# Patient Record
Sex: FEMALE | Race: ASIAN | NOT HISPANIC OR LATINO | URBAN - METROPOLITAN AREA
[De-identification: names, ages, dates, MRNs, and addresses within clinical notes are randomized per-mention and may not be internally consistent; named-entity substitution may affect disease eponyms.]

---

## 2018-02-12 ENCOUNTER — EMERGENCY (EMERGENCY)
Facility: HOSPITAL | Age: 28
LOS: 1 days | Discharge: HOME | End: 2018-02-12
Attending: EMERGENCY MEDICINE | Admitting: EMERGENCY MEDICINE
Payer: MEDICAID

## 2018-02-12 VITALS
RESPIRATION RATE: 16 BRPM | WEIGHT: 110.01 LBS | HEART RATE: 126 BPM | SYSTOLIC BLOOD PRESSURE: 129 MMHG | DIASTOLIC BLOOD PRESSURE: 80 MMHG | OXYGEN SATURATION: 100 % | TEMPERATURE: 99 F | HEIGHT: 64 IN

## 2018-02-12 VITALS
TEMPERATURE: 98 F | OXYGEN SATURATION: 100 % | DIASTOLIC BLOOD PRESSURE: 74 MMHG | HEART RATE: 94 BPM | RESPIRATION RATE: 16 BRPM | SYSTOLIC BLOOD PRESSURE: 105 MMHG

## 2018-02-12 DIAGNOSIS — O03.4 INCOMPLETE SPONTANEOUS ABORTION WITHOUT COMPLICATION: ICD-10-CM

## 2018-02-12 LAB
ALBUMIN SERPL ELPH-MCNC: 4.2 G/DL — SIGNIFICANT CHANGE UP (ref 3.3–5)
ALP SERPL-CCNC: 47 U/L — SIGNIFICANT CHANGE UP (ref 40–120)
ALT FLD-CCNC: 10 U/L — SIGNIFICANT CHANGE UP (ref 4–33)
APPEARANCE UR: CLEAR — SIGNIFICANT CHANGE UP
APTT BLD: 32.9 SEC — SIGNIFICANT CHANGE UP (ref 27.5–37.4)
AST SERPL-CCNC: 18 U/L — SIGNIFICANT CHANGE UP (ref 4–32)
BASOPHILS # BLD AUTO: 0.02 K/UL — SIGNIFICANT CHANGE UP (ref 0–0.2)
BASOPHILS NFR BLD AUTO: 0.2 % — SIGNIFICANT CHANGE UP (ref 0–2)
BILIRUB SERPL-MCNC: 0.3 MG/DL — SIGNIFICANT CHANGE UP (ref 0.2–1.2)
BILIRUB UR-MCNC: NEGATIVE — SIGNIFICANT CHANGE UP
BLD GP AB SCN SERPL QL: NEGATIVE — SIGNIFICANT CHANGE UP
BLOOD UR QL VISUAL: HIGH
BUN SERPL-MCNC: 9 MG/DL — SIGNIFICANT CHANGE UP (ref 7–23)
CALCIUM SERPL-MCNC: 9.4 MG/DL — SIGNIFICANT CHANGE UP (ref 8.4–10.5)
CHLORIDE SERPL-SCNC: 101 MMOL/L — SIGNIFICANT CHANGE UP (ref 98–107)
CO2 SERPL-SCNC: 24 MMOL/L — SIGNIFICANT CHANGE UP (ref 22–31)
COLOR SPEC: SIGNIFICANT CHANGE UP
CREAT SERPL-MCNC: 0.72 MG/DL — SIGNIFICANT CHANGE UP (ref 0.5–1.3)
EOSINOPHIL # BLD AUTO: 0 K/UL — SIGNIFICANT CHANGE UP (ref 0–0.5)
EOSINOPHIL NFR BLD AUTO: 0 % — SIGNIFICANT CHANGE UP (ref 0–6)
GLUCOSE SERPL-MCNC: 110 MG/DL — HIGH (ref 70–99)
GLUCOSE UR-MCNC: NEGATIVE — SIGNIFICANT CHANGE UP
HCG SERPL-ACNC: 27.49 MIU/ML — SIGNIFICANT CHANGE UP
HCT VFR BLD CALC: 34 % — LOW (ref 34.5–45)
HGB BLD-MCNC: 11.6 G/DL — SIGNIFICANT CHANGE UP (ref 11.5–15.5)
IMM GRANULOCYTES # BLD AUTO: 0.04 # — SIGNIFICANT CHANGE UP
IMM GRANULOCYTES NFR BLD AUTO: 0.4 % — SIGNIFICANT CHANGE UP (ref 0–1.5)
INR BLD: 1.32 — HIGH (ref 0.88–1.17)
KETONES UR-MCNC: HIGH
LEUKOCYTE ESTERASE UR-ACNC: NEGATIVE — SIGNIFICANT CHANGE UP
LYMPHOCYTES # BLD AUTO: 1.09 K/UL — SIGNIFICANT CHANGE UP (ref 1–3.3)
LYMPHOCYTES # BLD AUTO: 11.2 % — LOW (ref 13–44)
MCHC RBC-ENTMCNC: 32.1 PG — SIGNIFICANT CHANGE UP (ref 27–34)
MCHC RBC-ENTMCNC: 34.1 % — SIGNIFICANT CHANGE UP (ref 32–36)
MCV RBC AUTO: 94.2 FL — SIGNIFICANT CHANGE UP (ref 80–100)
MONOCYTES # BLD AUTO: 0.99 K/UL — HIGH (ref 0–0.9)
MONOCYTES NFR BLD AUTO: 10.2 % — SIGNIFICANT CHANGE UP (ref 2–14)
MUCOUS THREADS # UR AUTO: SIGNIFICANT CHANGE UP
NEUTROPHILS # BLD AUTO: 7.55 K/UL — HIGH (ref 1.8–7.4)
NEUTROPHILS NFR BLD AUTO: 78 % — HIGH (ref 43–77)
NITRITE UR-MCNC: NEGATIVE — SIGNIFICANT CHANGE UP
NRBC # FLD: 0 — SIGNIFICANT CHANGE UP
PH UR: 6.5 — SIGNIFICANT CHANGE UP (ref 4.6–8)
PLATELET # BLD AUTO: 184 K/UL — SIGNIFICANT CHANGE UP (ref 150–400)
PMV BLD: 10.3 FL — SIGNIFICANT CHANGE UP (ref 7–13)
POTASSIUM SERPL-MCNC: 3.9 MMOL/L — SIGNIFICANT CHANGE UP (ref 3.5–5.3)
POTASSIUM SERPL-SCNC: 3.9 MMOL/L — SIGNIFICANT CHANGE UP (ref 3.5–5.3)
PROT SERPL-MCNC: 7.5 G/DL — SIGNIFICANT CHANGE UP (ref 6–8.3)
PROT UR-MCNC: NEGATIVE MG/DL — SIGNIFICANT CHANGE UP
PROTHROM AB SERPL-ACNC: 15.3 SEC — HIGH (ref 9.8–13.1)
RBC # BLD: 3.61 M/UL — LOW (ref 3.8–5.2)
RBC # FLD: 11.7 % — SIGNIFICANT CHANGE UP (ref 10.3–14.5)
RBC CASTS # UR COMP ASSIST: SIGNIFICANT CHANGE UP (ref 0–?)
RH IG SCN BLD-IMP: POSITIVE — SIGNIFICANT CHANGE UP
SODIUM SERPL-SCNC: 138 MMOL/L — SIGNIFICANT CHANGE UP (ref 135–145)
SP GR SPEC: 1.02 — SIGNIFICANT CHANGE UP (ref 1–1.04)
SQUAMOUS # UR AUTO: SIGNIFICANT CHANGE UP
UROBILINOGEN FLD QL: NORMAL MG/DL — SIGNIFICANT CHANGE UP
WBC # BLD: 9.69 K/UL — SIGNIFICANT CHANGE UP (ref 3.8–10.5)
WBC # FLD AUTO: 9.69 K/UL — SIGNIFICANT CHANGE UP (ref 3.8–10.5)
WBC UR QL: SIGNIFICANT CHANGE UP (ref 0–?)

## 2018-02-12 PROCEDURE — 99285 EMERGENCY DEPT VISIT HI MDM: CPT

## 2018-02-12 PROCEDURE — 76830 TRANSVAGINAL US NON-OB: CPT | Mod: 26

## 2018-02-12 RX ORDER — SODIUM CHLORIDE 9 MG/ML
2000 INJECTION INTRAMUSCULAR; INTRAVENOUS; SUBCUTANEOUS ONCE
Qty: 0 | Refills: 0 | Status: COMPLETED | OUTPATIENT
Start: 2018-02-12 | End: 2018-02-12

## 2018-02-12 RX ADMIN — SODIUM CHLORIDE 2000 MILLILITER(S): 9 INJECTION INTRAMUSCULAR; INTRAVENOUS; SUBCUTANEOUS at 17:59

## 2018-02-12 NOTE — ED PROVIDER NOTE - ATTENDING CONTRIBUTION TO CARE
Naila WALLACE- 28 y/o F with  LMP 2 wks ago and had recent medical  p/w heavy vag bleeding today. Pt states that she had vag bleeding post  and then resolved and now started since yesterday again, no pelvic pain, nausea, vomiting but feels weak and tired    pt is alert, well appearing female, s1s2 normal reg, b/l clear breath sounds, abd soft, nt, nd, normal bowel sounds, pelvic exam showed pooling of blood but os closed, no cvat b/l, neuro exam aox3, cn 2-12 intact, skin warm, dry, good turgor    plan to check labs, ua, us , evaluate for imcomplete  Naila WALLACE- 26 y/o F with  LMP 2 wks ago and had recent medical  p/w heavy vag bleeding today. Pt states that she had vag bleeding post  and then resolved and now started since yesterday again, no pelvic pain, nausea, vomiting but feels weak and tired    pt is alert, well appearing female, s1s2 normal reg, b/l clear breath sounds, abd soft, nt, nd, normal bowel sounds, pelvic exam showed pooling of blood but os closed, no cvat b/l, neuro exam aox3, cn 2-12 intact, skin warm, dry, good turgor    plan to check labs, ua, us , evaluate for incomplete

## 2018-02-12 NOTE — CONSULT NOTE ADULT - ASSESSMENT
28yo  LMP  1 month s/p medical termination @ 4wks with vaginal bleeding found to have retained products of conception    Pt is afebrile, hemodynamically stable without abdominal pain or heavy bleeding on exam. No clinical signs of infection or bleeding

## 2018-02-12 NOTE — CONSULT NOTE ADULT - SUBJECTIVE AND OBJECTIVE BOX
HPI:    27y G_P_, LMP      presents with       OBHx:  GynHx:   PMH:  PSH:  All:  Meds:   SocialHx:     Vital Signs Last 24 Hrs  T(C): 36.8 (12 Feb 2018 20:31), Max: 37.1 (12 Feb 2018 15:45)  T(F): 98.2 (12 Feb 2018 20:31), Max: 98.7 (12 Feb 2018 15:45)  HR: 94 (12 Feb 2018 20:31) (94 - 126)  BP: 105/74 (12 Feb 2018 20:31) (105/74 - 134/89)  BP(mean): --  RR: 16 (12 Feb 2018 20:31) (16 - 16)  SpO2: 100% (12 Feb 2018 20:31) (100% - 100%)    PE:  Gen: Comfortable, NAD  CV: RRR  Pulm: CTAB  Abd: Soft, NT  Ext: No edema or tenderness bilaterally  Spec Exam:    LABS:                        11.6   9.69  )-----------( 184      ( 12 Feb 2018 18:00 )             34.0     02-12    138  |  101  |  9   ----------------------------<  110<H>  3.9   |  24  |  0.72    Ca    9.4      12 Feb 2018 18:00    TPro  7.5  /  Alb  4.2  /  TBili  0.3  /  DBili  x   /  AST  18  /  ALT  10  /  AlkPhos  47  02-12    PT/INR - ( 12 Feb 2018 18:00 )   PT: 15.3 SEC;   INR: 1.32          PTT - ( 12 Feb 2018 18:00 )  PTT:32.9 SEC      RADIOLOGY & ADDITIONAL STUDIES:  < from: US Transvaginal (02.12.18 @ 19:55) >    FINDINGS:    Uterus: 8.0 x 4.7 x 5.5 cm. Within normal limits.    Endometrium: Measures up to 12 mm. Heterogeneous endometrial thickening   in the fundus with associated trophoblastic flow with peak systolic   velocity of 47 cm/s, compatible with retained products of conception..    Right ovary: 3.1 x 2.0 x 1.8 cm, containing a 0.7 x 0.7 x 0.8 cm corpus   luteum.    Left ovary: 2.9 x 1.8 x 2.3 cm. Within normal limits.    Fluid: None.    IMPRESSION:  Endometrial thickening with hypervascularity in keeping with retained   products of conception at the uterine fundus. R2 GYN Consult Note    26yo  LMP  1 month s/p medical termination @ 4wks GA at [facility] (1/15/18) presenting to ED with vaginal bleeding x 2 days.       OBHx:  GynHx:   PMH:  PSH:  All:  Meds:   SocialHx:     Vital Signs Last 24 Hrs  T(C): 36.8 (2018 20:31), Max: 37.1 (2018 15:45)  T(F): 98.2 (2018 20:31), Max: 98.7 (2018 15:45)  HR: 94 (2018 20:31) (94 - 126)  BP: 105/74 (2018 20:31) (105/74 - 134/89)  BP(mean): --  RR: 16 (2018 20:31) (16 - 16)  SpO2: 100% (2018 20:31) (100% - 100%)    PE:  Gen: Comfortable, NAD  CV: RRR  Pulm: CTAB  Abd: Soft, NT  Ext: No edema or tenderness bilaterally  Spec Exam:    LABS:                        11.6   9.69  )-----------( 184      ( 2018 18:00 )             34.0     02-12    138  |  101  |  9   ----------------------------<  110<H>  3.9   |  24  |  0.72    Ca    9.4      2018 18:00    TPro  7.5  /  Alb  4.2  /  TBili  0.3  /  DBili  x   /  AST  18  /  ALT  10  /  AlkPhos  47  -12    PT/INR - ( 2018 18:00 )   PT: 15.3 SEC;   INR: 1.32          PTT - ( 2018 18:00 )  PTT:32.9 SEC      RADIOLOGY & ADDITIONAL STUDIES:  < from: US Transvaginal (18 @ 19:55) >    FINDINGS:    Uterus: 8.0 x 4.7 x 5.5 cm. Within normal limits.    Endometrium: Measures up to 12 mm. Heterogeneous endometrial thickening   in the fundus with associated trophoblastic flow with peak systolic   velocity of 47 cm/s, compatible with retained products of conception..    Right ovary: 3.1 x 2.0 x 1.8 cm, containing a 0.7 x 0.7 x 0.8 cm corpus   luteum.    Left ovary: 2.9 x 1.8 x 2.3 cm. Within normal limits.    Fluid: None.    IMPRESSION:  Endometrial thickening with hypervascularity in keeping with retained   products of conception at the uterine fundus. R2 GYN Consult Note    26yo  LMP  1 month s/p medical termination @ 4wks GA at [facility] (1/15/18) presenting to ED with vaginal bleeding x 2 days. Patient took cytotec as prescribed, experienced bleeding for 1wk, lighter for the next week then no bleeding at all for 2 weeks until the past 2 days. Patient was seen at [facility] 2 weeks after termination, and was told that her uterus was empty on ultrasound. Bleeding described as light period yesterday, then heavier this morning with large clots in bathroom. At that time, patient felt lightheaded so she decided to come in to ED. Patient now reports lighter bleeding since 2 pm today.   Denies fever, chills, chest pain, SOB, palpitations. She is ambulating without difficulty and tolerating regular diet without nausea or vomiting.  GYN = [place]    OBHx:  x1, eTOP x1 (med)  GynHx: denies h/o fibroids, cysts, STDs  PAST MEDICAL & SURGICAL HISTORY:  No pertinent past medical history  No significant past surgical history      Vital Signs Last 24 Hrs  T(C): 36.8 (2018 20:31), Max: 37.1 (2018 15:45)  T(F): 98.2 (2018 20:31), Max: 98.7 (2018 15:45)  HR: 94 (2018 20:31) (94 - 126)  BP: 105/74 (2018 20:31) (105/74 - 134/89)  RR: 16 (2018 20:31) (16 - 16)  SpO2: 100% (2018 20:31) (100% - 100%)    PE:  Gen: Comfortable, NAD  CV: RRR  Pulm: CTAB  Abd: Soft, NT, no rebound or guarding, no fundal tenderness  Ext: No edema or tenderness bilaterally  Spec Exam: small dark blood evacuated from os, no active bleeding on Valsalva  Bimanual: small anteverted uterus nontender, no CMT, no adnexal tenderness bilaterally    LABS:                        11.6   9.69  )-----------( 184      ( 2018 18:00 )             34.0     02-12    138  |  101  |  9   ----------------------------<  110<H>  3.9   |  24  |  0.72    Ca    9.4      2018 18:00    TPro  7.5  /  Alb  4.2  /  TBili  0.3  /  DBili  x   /  AST  18  /  ALT  10  /  AlkPhos  47      PT/INR - ( 2018 18:00 )   PT: 15.3 SEC;   INR: 1.32          PTT - ( 2018 18:00 )  PTT:32.9 SEC      RADIOLOGY & ADDITIONAL STUDIES:  < from: US Transvaginal (18 @ 19:55) >    FINDINGS:    Uterus: 8.0 x 4.7 x 5.5 cm. Within normal limits.    Endometrium: Measures up to 12 mm. Heterogeneous endometrial thickening   in the fundus with associated trophoblastic flow with peak systolic   velocity of 47 cm/s, compatible with retained products of conception..    Right ovary: 3.1 x 2.0 x 1.8 cm, containing a 0.7 x 0.7 x 0.8 cm corpus   luteum.    Left ovary: 2.9 x 1.8 x 2.3 cm. Within normal limits.    Fluid: None.    IMPRESSION:  Endometrial thickening with hypervascularity in keeping with retained   products of conception at the uterine fundus.

## 2018-02-12 NOTE — ED PROVIDER NOTE - OBJECTIVE STATEMENT
27F  p/w 2 days of vaginal bleeding requiring 6pads today. Pt took " pill" in mid january with 2 weeks of bleeding following. Pt had 2 weeks of no vaginal bleeding, and bleeding started again yesterday. Pt endorses dizziness, denies SOB, chest pain, dyspnea on exertion. Pt had ab at [facility]. No fevers, chills, cough, abd pain, pelvic pain, n/v/d

## 2018-02-12 NOTE — ED ADULT NURSE NOTE - OBJECTIVE STATEMENT
pt received spot 19. pt A+Ox3 states she was prescribed  pills by her gyn at a clinic in mid january. reports bleeding x 2 weeks afterwards. states heavy bleeding began yesterday again. +clots. +weakness. denies cp/sob. repeat vs as noted. IVSL in place. labs sent. NAD noted at this time. will monitor.

## 2018-02-12 NOTE — CONSULT NOTE ADULT - PROBLEM SELECTOR RECOMMENDATION 9
- misoprostol 600mcg buccally x1  - advised patient on expectations. Given precautions to return for fever, heavy bleeding > 8hrs, lightheadedness or other acute sx  - advised to not insert anything into vagina  - return to [facility] GYN clinic for followup. Pt given number. Will contact clinic as well    D/w Dr. Dorian Forde, PGY2  [id number]

## 2018-02-12 NOTE — ED PROVIDER NOTE - MEDICAL DECISION MAKING DETAILS
27F with recent medication-induced ab p/w heavy vaginal bleeding x2 days. Check CBC CMP HCG TVUS and reassess. Concern for retained products

## 2018-02-12 NOTE — ED ADULT TRIAGE NOTE - CHIEF COMPLAINT QUOTE
Pt took  pill 4 weeks ago, went to follow up appointment told "everything normal" pt states she has been bleeding heavily with clots for two weeks used 6 pads today so far, pt states no pain but starting to feel dizzy and weak

## 2018-02-13 NOTE — CHART NOTE - NSCHARTNOTEFT_GEN_A_CORE
Patient called GYN resident on call due to questions regarding medication. Patient was seen in ED overnight s/p medication  4 weeks ago, she was given cytotec in ED due to possible retained products. Patient is concerned because she is NOT bleeding heavily and thought she would. She is having some vaginal bleeding and used 1 pad since last night. Counseled patient that this amount of bleeding may be normal. Denies fevers, chills, CP, SOB, N/V. Denies any pain. Patient has f/u appt in [facility] GYN clinic on  at 4:30pm. She will return to hospital with heavy bleeding, pain, fever, etc.    Angeline Hitchcock MD PGY4 #[id number]

## 2018-02-13 NOTE — ED POST DISCHARGE NOTE - REASON FOR FOLLOW-UP
Other Admin PA Note: Patient called with a question when she should get her period. I contacted GYN resident Cristin who will call patient to discuss with patient any concerns or questions.

## 2018-02-21 ENCOUNTER — APPOINTMENT (OUTPATIENT)
Dept: OBGYN | Facility: HOSPITAL | Age: 28
End: 2018-02-21
Payer: MEDICAID

## 2018-02-21 ENCOUNTER — OUTPATIENT (OUTPATIENT)
Dept: OUTPATIENT SERVICES | Facility: HOSPITAL | Age: 28
LOS: 1 days | End: 2018-02-21

## 2018-02-21 VITALS — WEIGHT: 110 LBS | SYSTOLIC BLOOD PRESSURE: 101 MMHG | DIASTOLIC BLOOD PRESSURE: 62 MMHG | HEART RATE: 92 BPM

## 2018-02-21 DIAGNOSIS — Z00.00 ENCOUNTER FOR GENERAL ADULT MEDICAL EXAMINATION W/OUT ABNORMAL FINDINGS: ICD-10-CM

## 2018-02-21 PROCEDURE — 99213 OFFICE O/P EST LOW 20 MIN: CPT | Mod: GE

## 2018-02-23 DIAGNOSIS — Z00.00 ENCOUNTER FOR GENERAL ADULT MEDICAL EXAMINATION WITHOUT ABNORMAL FINDINGS: ICD-10-CM

## 2018-08-10 ENCOUNTER — EMERGENCY (EMERGENCY)
Facility: HOSPITAL | Age: 28
LOS: 1 days | Discharge: HOME | End: 2018-08-10
Admitting: EMERGENCY MEDICINE
Payer: MEDICAID

## 2018-08-10 VITALS
SYSTOLIC BLOOD PRESSURE: 114 MMHG | TEMPERATURE: 99 F | HEART RATE: 104 BPM | DIASTOLIC BLOOD PRESSURE: 74 MMHG | OXYGEN SATURATION: 100 % | RESPIRATION RATE: 16 BRPM

## 2018-08-10 LAB
ALBUMIN SERPL ELPH-MCNC: 4.1 G/DL — SIGNIFICANT CHANGE UP (ref 3.3–5)
ALP SERPL-CCNC: 41 U/L — SIGNIFICANT CHANGE UP (ref 40–120)
ALT FLD-CCNC: 9 U/L — SIGNIFICANT CHANGE UP (ref 4–33)
APPEARANCE UR: CLEAR — SIGNIFICANT CHANGE UP
AST SERPL-CCNC: 18 U/L — SIGNIFICANT CHANGE UP (ref 4–32)
BASOPHILS # BLD AUTO: 0.05 K/UL — SIGNIFICANT CHANGE UP (ref 0–0.2)
BASOPHILS NFR BLD AUTO: 0.3 % — SIGNIFICANT CHANGE UP (ref 0–2)
BILIRUB SERPL-MCNC: 0.2 MG/DL — SIGNIFICANT CHANGE UP (ref 0.2–1.2)
BILIRUB UR-MCNC: NEGATIVE — SIGNIFICANT CHANGE UP
BLD GP AB SCN SERPL QL: NEGATIVE — SIGNIFICANT CHANGE UP
BLOOD UR QL VISUAL: HIGH
BUN SERPL-MCNC: 8 MG/DL — SIGNIFICANT CHANGE UP (ref 7–23)
CALCIUM SERPL-MCNC: 9.2 MG/DL — SIGNIFICANT CHANGE UP (ref 8.4–10.5)
CHLORIDE SERPL-SCNC: 103 MMOL/L — SIGNIFICANT CHANGE UP (ref 98–107)
CO2 SERPL-SCNC: 22 MMOL/L — SIGNIFICANT CHANGE UP (ref 22–31)
COLOR SPEC: SIGNIFICANT CHANGE UP
CREAT SERPL-MCNC: 0.64 MG/DL — SIGNIFICANT CHANGE UP (ref 0.5–1.3)
EOSINOPHIL # BLD AUTO: 0.03 K/UL — SIGNIFICANT CHANGE UP (ref 0–0.5)
EOSINOPHIL NFR BLD AUTO: 0.2 % — SIGNIFICANT CHANGE UP (ref 0–6)
GLUCOSE SERPL-MCNC: 82 MG/DL — SIGNIFICANT CHANGE UP (ref 70–99)
GLUCOSE UR-MCNC: NEGATIVE — SIGNIFICANT CHANGE UP
HCG SERPL-ACNC: SIGNIFICANT CHANGE UP MIU/ML
HCT VFR BLD CALC: 35.7 % — SIGNIFICANT CHANGE UP (ref 34.5–45)
HGB BLD-MCNC: 12 G/DL — SIGNIFICANT CHANGE UP (ref 11.5–15.5)
IMM GRANULOCYTES # BLD AUTO: 0.09 # — SIGNIFICANT CHANGE UP
IMM GRANULOCYTES NFR BLD AUTO: 0.6 % — SIGNIFICANT CHANGE UP (ref 0–1.5)
KETONES UR-MCNC: NEGATIVE — SIGNIFICANT CHANGE UP
LEUKOCYTE ESTERASE UR-ACNC: NEGATIVE — SIGNIFICANT CHANGE UP
LYMPHOCYTES # BLD AUTO: 1.62 K/UL — SIGNIFICANT CHANGE UP (ref 1–3.3)
LYMPHOCYTES # BLD AUTO: 11.3 % — LOW (ref 13–44)
MCHC RBC-ENTMCNC: 30.5 PG — SIGNIFICANT CHANGE UP (ref 27–34)
MCHC RBC-ENTMCNC: 33.6 % — SIGNIFICANT CHANGE UP (ref 32–36)
MCV RBC AUTO: 90.8 FL — SIGNIFICANT CHANGE UP (ref 80–100)
MONOCYTES # BLD AUTO: 0.92 K/UL — HIGH (ref 0–0.9)
MONOCYTES NFR BLD AUTO: 6.4 % — SIGNIFICANT CHANGE UP (ref 2–14)
MUCOUS THREADS # UR AUTO: SIGNIFICANT CHANGE UP
NEUTROPHILS # BLD AUTO: 11.64 K/UL — HIGH (ref 1.8–7.4)
NEUTROPHILS NFR BLD AUTO: 81.2 % — HIGH (ref 43–77)
NITRITE UR-MCNC: NEGATIVE — SIGNIFICANT CHANGE UP
NRBC # FLD: 0 — SIGNIFICANT CHANGE UP
PH UR: 6 — SIGNIFICANT CHANGE UP (ref 4.6–8)
PLATELET # BLD AUTO: 200 K/UL — SIGNIFICANT CHANGE UP (ref 150–400)
PMV BLD: 10.6 FL — SIGNIFICANT CHANGE UP (ref 7–13)
POTASSIUM SERPL-MCNC: 4 MMOL/L — SIGNIFICANT CHANGE UP (ref 3.5–5.3)
POTASSIUM SERPL-SCNC: 4 MMOL/L — SIGNIFICANT CHANGE UP (ref 3.5–5.3)
PROT SERPL-MCNC: 7.1 G/DL — SIGNIFICANT CHANGE UP (ref 6–8.3)
PROT UR-MCNC: NEGATIVE MG/DL — SIGNIFICANT CHANGE UP
RBC # BLD: 3.93 M/UL — SIGNIFICANT CHANGE UP (ref 3.8–5.2)
RBC # FLD: 12.2 % — SIGNIFICANT CHANGE UP (ref 10.3–14.5)
RBC CASTS # UR COMP ASSIST: SIGNIFICANT CHANGE UP (ref 0–?)
RH IG SCN BLD-IMP: POSITIVE — SIGNIFICANT CHANGE UP
SODIUM SERPL-SCNC: 139 MMOL/L — SIGNIFICANT CHANGE UP (ref 135–145)
SP GR SPEC: 1.01 — SIGNIFICANT CHANGE UP (ref 1–1.04)
SQUAMOUS # UR AUTO: SIGNIFICANT CHANGE UP
UROBILINOGEN FLD QL: NORMAL MG/DL — SIGNIFICANT CHANGE UP
WBC # BLD: 14.35 K/UL — HIGH (ref 3.8–10.5)
WBC # FLD AUTO: 14.35 K/UL — HIGH (ref 3.8–10.5)
WBC UR QL: SIGNIFICANT CHANGE UP (ref 0–?)

## 2018-08-10 PROCEDURE — 76830 TRANSVAGINAL US NON-OB: CPT | Mod: 26

## 2018-08-10 PROCEDURE — 99285 EMERGENCY DEPT VISIT HI MDM: CPT

## 2018-08-10 NOTE — ED PROVIDER NOTE - OBJECTIVE STATEMENT
27 y/o female , 9 weeks pregnant, LMP  c/o lower abd cramping x5 days and vaginal bleeding x today. Pt admits to intermittent cramping pain similar to period pain. Pt admits to light vaginal bleeding today, similar to the 1st day of her period. Pt admits to having + IUP several weeks ago. Pt denies chest pain, sob, n/v/d, dysuria, passing clots, numbness, tingling, weakness, dizziness, syncope, fever or chills.

## 2018-08-10 NOTE — ED ADULT NURSE NOTE - NSIMPLEMENTINTERV_GEN_ALL_ED
Implemented All Universal Safety Interventions:  [place] to call system. Call bell, personal items and telephone within reach. Instruct patient to call for assistance. Room bathroom lighting operational. Non-slip footwear when patient is off stretcher. Physically safe environment: no spills, clutter or unnecessary equipment. Stretcher in lowest position, wheels locked, appropriate side rails in place.

## 2018-08-10 NOTE — ED ADULT NURSE NOTE - OBJECTIVE STATEMENT
27 y/o female presents with c/o abd cramping.  Pt states that she is 9 weeks pregnant LMP , .  Pt states that she has been having lower abd cramping for the last 5 days similar to menstrual cramps and started having some light vaginal bleeding today.  Pt denies n/v/d, no fever chills, denies dizziness or lightheadedness.  IV established, labs drawn and sent.  family at bedside, will cont to monitor.

## 2018-08-11 ENCOUNTER — EMERGENCY (EMERGENCY)
Facility: HOSPITAL | Age: 28
LOS: 1 days | Discharge: HOME | End: 2018-08-11
Attending: EMERGENCY MEDICINE | Admitting: EMERGENCY MEDICINE
Payer: MEDICAID

## 2018-08-11 VITALS
OXYGEN SATURATION: 100 % | TEMPERATURE: 98 F | HEART RATE: 80 BPM | SYSTOLIC BLOOD PRESSURE: 115 MMHG | RESPIRATION RATE: 18 BRPM | DIASTOLIC BLOOD PRESSURE: 69 MMHG

## 2018-08-11 VITALS
OXYGEN SATURATION: 100 % | SYSTOLIC BLOOD PRESSURE: 123 MMHG | HEART RATE: 100 BPM | TEMPERATURE: 98 F | DIASTOLIC BLOOD PRESSURE: 92 MMHG | RESPIRATION RATE: 18 BRPM

## 2018-08-11 DIAGNOSIS — O20.0 THREATENED ABORTION: ICD-10-CM

## 2018-08-11 LAB
ALBUMIN SERPL ELPH-MCNC: 4.1 G/DL — SIGNIFICANT CHANGE UP (ref 3.3–5)
ALP SERPL-CCNC: 42 U/L — SIGNIFICANT CHANGE UP (ref 40–120)
ALT FLD-CCNC: 11 U/L — SIGNIFICANT CHANGE UP (ref 4–33)
APTT BLD: 29.5 SEC — SIGNIFICANT CHANGE UP (ref 27.5–37.4)
AST SERPL-CCNC: 18 U/L — SIGNIFICANT CHANGE UP (ref 4–32)
BASOPHILS # BLD AUTO: 0.05 K/UL — SIGNIFICANT CHANGE UP (ref 0–0.2)
BASOPHILS NFR BLD AUTO: 0.3 % — SIGNIFICANT CHANGE UP (ref 0–2)
BILIRUB SERPL-MCNC: 0.3 MG/DL — SIGNIFICANT CHANGE UP (ref 0.2–1.2)
BLD GP AB SCN SERPL QL: NEGATIVE — SIGNIFICANT CHANGE UP
BUN SERPL-MCNC: 7 MG/DL — SIGNIFICANT CHANGE UP (ref 7–23)
CALCIUM SERPL-MCNC: 9.1 MG/DL — SIGNIFICANT CHANGE UP (ref 8.4–10.5)
CHLORIDE SERPL-SCNC: 102 MMOL/L — SIGNIFICANT CHANGE UP (ref 98–107)
CO2 SERPL-SCNC: 22 MMOL/L — SIGNIFICANT CHANGE UP (ref 22–31)
CREAT SERPL-MCNC: 0.64 MG/DL — SIGNIFICANT CHANGE UP (ref 0.5–1.3)
EOSINOPHIL # BLD AUTO: 0.06 K/UL — SIGNIFICANT CHANGE UP (ref 0–0.5)
EOSINOPHIL NFR BLD AUTO: 0.4 % — SIGNIFICANT CHANGE UP (ref 0–6)
GLUCOSE SERPL-MCNC: 99 MG/DL — SIGNIFICANT CHANGE UP (ref 70–99)
HCT VFR BLD CALC: 37 % — SIGNIFICANT CHANGE UP (ref 34.5–45)
HGB BLD-MCNC: 12.5 G/DL — SIGNIFICANT CHANGE UP (ref 11.5–15.5)
IMM GRANULOCYTES # BLD AUTO: 0.08 # — SIGNIFICANT CHANGE UP
IMM GRANULOCYTES NFR BLD AUTO: 0.5 % — SIGNIFICANT CHANGE UP (ref 0–1.5)
INR BLD: 1.26 — HIGH (ref 0.88–1.17)
LYMPHOCYTES # BLD AUTO: 1.37 K/UL — SIGNIFICANT CHANGE UP (ref 1–3.3)
LYMPHOCYTES # BLD AUTO: 8.1 % — LOW (ref 13–44)
MCHC RBC-ENTMCNC: 31.5 PG — SIGNIFICANT CHANGE UP (ref 27–34)
MCHC RBC-ENTMCNC: 33.8 % — SIGNIFICANT CHANGE UP (ref 32–36)
MCV RBC AUTO: 93.2 FL — SIGNIFICANT CHANGE UP (ref 80–100)
MONOCYTES # BLD AUTO: 0.93 K/UL — HIGH (ref 0–0.9)
MONOCYTES NFR BLD AUTO: 5.5 % — SIGNIFICANT CHANGE UP (ref 2–14)
NEUTROPHILS # BLD AUTO: 14.5 K/UL — HIGH (ref 1.8–7.4)
NEUTROPHILS NFR BLD AUTO: 85.2 % — HIGH (ref 43–77)
NRBC # FLD: 0 — SIGNIFICANT CHANGE UP
PLATELET # BLD AUTO: 213 K/UL — SIGNIFICANT CHANGE UP (ref 150–400)
PMV BLD: 10.8 FL — SIGNIFICANT CHANGE UP (ref 7–13)
POTASSIUM SERPL-MCNC: 4 MMOL/L — SIGNIFICANT CHANGE UP (ref 3.5–5.3)
POTASSIUM SERPL-SCNC: 4 MMOL/L — SIGNIFICANT CHANGE UP (ref 3.5–5.3)
PROT SERPL-MCNC: 7.2 G/DL — SIGNIFICANT CHANGE UP (ref 6–8.3)
PROTHROM AB SERPL-ACNC: 14 SEC — HIGH (ref 9.8–13.1)
RBC # BLD: 3.97 M/UL — SIGNIFICANT CHANGE UP (ref 3.8–5.2)
RBC # FLD: 12.3 % — SIGNIFICANT CHANGE UP (ref 10.3–14.5)
RH IG SCN BLD-IMP: POSITIVE — SIGNIFICANT CHANGE UP
SODIUM SERPL-SCNC: 137 MMOL/L — SIGNIFICANT CHANGE UP (ref 135–145)
WBC # BLD: 16.99 K/UL — HIGH (ref 3.8–10.5)
WBC # FLD AUTO: 16.99 K/UL — HIGH (ref 3.8–10.5)

## 2018-08-11 PROCEDURE — 76830 TRANSVAGINAL US NON-OB: CPT | Mod: 26

## 2018-08-11 PROCEDURE — 99285 EMERGENCY DEPT VISIT HI MDM: CPT | Mod: 25

## 2018-08-11 RX ORDER — ACETAMINOPHEN 500 MG
975 TABLET ORAL ONCE
Qty: 0 | Refills: 0 | Status: COMPLETED | OUTPATIENT
Start: 2018-08-11 | End: 2018-08-11

## 2018-08-11 RX ADMIN — Medication 975 MILLIGRAM(S): at 07:08

## 2018-08-11 NOTE — ED ADULT TRIAGE NOTE - CHIEF COMPLAINT QUOTE
pt seen here and dc a few hours ago, Dx with threatened  and told to f/u with GYN for rpt blood work. pt 9weeks, 4 days pregnant on US rd.  c/o continuos bleeding with clots and pelvic pain. pt appears uncomfortable in triage.

## 2018-08-11 NOTE — ED PROVIDER NOTE - PROGRESS NOTE DETAILS
Kiah: fetal HR 186bpm Naila WALLACE- pt seen by gyn, no active bleeding, noted live iup, advised to follow up with her ob doctor in 1 WK

## 2018-08-11 NOTE — ED PROVIDER NOTE - MEDICAL DECISION MAKING DETAILS
28F with pelvic pain and vaginal bleeding, threatened ab. check labs with H&H, reconfirm IUP, OB eval.

## 2018-08-11 NOTE — CONSULT NOTE ADULT - SUBJECTIVE AND OBJECTIVE BOX
Patient is yo     LMP 2018 at 9w4d by LMP presenting with chief complaint of vaginal bleeding. Patient notes that bleeding started on yesterday. Bleeding required 3pads in 24 hours, passing some small clots yesterday but not more today. Patient denies h/o fibroids, adenomyosis or history of heavy VB and AUB. Patient denies chest pain, palpitations, shortness of breath, dizziness, lightheadedness, weakness, and feeling faint. Patient denies abdominal pain and lower abdominal cramping. Patient denies nausea, vomiting, fevers and chills. Patient denies dysuria, hematuria, back pain and urinary frequency. Patient is passing flatus and having formed bowel movements. She denies constipation and diarrhea.  Patient is sexually active w/ 1 male partner.  Last had sex 4d ago.        GYN = [facility]    POb: FT  , SAB at 7wks 2018  PGyn: denies h/o AUB, fibroids, ovarian cysts, PID, GC/CL, HIV, Hepatitis, abnormal PAPs, infertility, GYN surgery  PMH: denies   PSH: denies   Meds: denies   All: denies       Vital Signs Last 24 Hrs  T(C): 36.6 (11 Aug 2018 08:34), Max: 37.4 (10 Aug 2018 12:33)  T(F): 97.9 (11 Aug 2018 08:34), Max: 99.4 (10 Aug 2018 12:33)  HR: 80 (11 Aug 2018 08:34) (79 - 104)  BP: 115/69 (11 Aug 2018 08:34) (112/71 - 123/92)  BP(mean): --  RR: 18 (11 Aug 2018 08:34) (16 - 18)  SpO2: 100% (11 Aug 2018 08:34) (100% - 100%)    PE:  Gen: Comfortable, NAD  CV: RRR  Pulm: CTAB  Abd: Soft, NT  Ext: No edema or tenderness bilaterally  Spec Exam: 5cc pooling blood in vaginal vault, no active bleeding, cervix closed. No CMT, no adnexal tenderness.     LABS:                        12.5   16.99 )-----------( 213      ( 11 Aug 2018 04:31 )             37.0                         12.0   14.35 )-----------( 200      ( 10 Aug 2018 14:21 )             35.7     08-    137  |  102  |  7   ----------------------------<  99  4.0   |  22  |  0.64    Ca    9.1      11 Aug 2018 04:31    TPro  7.2  /  Alb  4.1  /  TBili  0.3  /  DBili  x   /  AST  18  /  ALT  11  /  AlkPhos  42      PT/INR - ( 11 Aug 2018 04:31 )   PT: 14.0 SEC;   INR: 1.26          PTT - ( 11 Aug 2018 04:31 )  PTT:29.5 SEC  Urinalysis Basic - ( 10 Aug 2018 14:21 )    Color: PLYEL / Appearance: CLEAR / S.010 / pH: 6.0  Gluc: NEGATIVE / Ketone: NEGATIVE  / Bili: NEGATIVE / Urobili: NORMAL mg/dL   Blood: SMALL / Protein: NEGATIVE mg/dL / Nitrite: NEGATIVE   Leuk Esterase: NEGATIVE / RBC: 0-2 / WBC 2-5   Sq Epi: OCC / Non Sq Epi: x / Bacteria: x        RADIOLOGY & ADDITIONAL STUDIES:  < from: US Transvaginal (18 @ 09:30) >    EXAM:  US TRANSVAGINAL        PROCEDURE DATE:  Aug 11 2018     INTERPRETATION:  CLINICAL INFORMATION: Vaginal bleeding; unchanged from   prior examination yesterday    LMP: 2018  Estimated Gestational Age by LMP: 9 weeks 4 days    COMPARISON:Transvaginal and transabdominal ultrasound acquired 8/10/2018    TECHNIQUE: Transabdominal and Endovaginal pelvic sonogram.     FINDINGS:    Uterus:  Single live intrauterine gestation.    Crown Rump Length: 3.13 cm   Estimated Gestational Age:10 weeks 0 days  Yolk Sac: The yolk sac demonstrated on the previous exam was not   discretely demonstrated on the submitted images.  Fetal Heart Rate: 196 bpm    Right adnexa: The right ovary measures 3.0 x 1.8 x 2.7 cm.    Left adnexa: The left ovary measures 3.9 x 13.4 x 2.5 cm. A 3.3 cm lesion   with low-level echoes.    IMPRESSION:    Single live intrauterine pregnancy.  Estimated gestational age (based on CRL) of   10 weeks 0 days  A 3.3 cm left ovarian lesion with low-level echoes may represent   hemorrhagic cyst or endometrioma; recommend continued follow-up.    RIVAS CARPENTER M.D., RADIOLOGY RESIDENT  This document has been electronically signed.  ALBERT MORRIS M.D., ATTENDING RADIOLOGIST  This document has been electronically signed. Aug 11 2018 11:19AM

## 2018-08-11 NOTE — ED ADULT NURSE NOTE - OBJECTIVE STATEMENT
pt received to the ed came in 9 wks pregnant c/o vaginal bleeding that became heavier around 2am this morning. pt is a&ox4 and ambulatory at baseline, skin intact, respirations even and unlabored. abd is soft and non-distended, tender to palpation in lower abd region. pt reports clots and going through 3 pads this morning. pt endorses weakness and dizziness. 20G placed in left arm, labs drawn and sent. will continue to monitor. pt received to the ed came in 9 wks pregnant c/o vaginal bleeding that became heavier around 2am this morning. pt is a&ox4 and ambulatory at baseline, skin intact, respirations even and unlabored. abd is soft and non-distended, tender to palpation in lower abd region. pt reports clots and going through 3 pads this morning. pt endorses weakness and dizziness. pt reported being a patient here yesterday for similar symptoms. 20G placed in left arm, labs drawn and sent. will continue to monitor.

## 2018-08-11 NOTE — ED ADULT NURSE REASSESSMENT NOTE - NS ED NURSE REASSESS COMMENT FT1
Received rpt from night JACINTA Aragon. Pt. A&Ox4, c/o lower abdominal cramping and vaginal bleeding x 2-3 days. Was seen in ER yesterday with same symptoms. Pt. 9wk4d pregnant. States this is her second pregnacy. No other complications. Awaiting US and GYN consult. Will continue to monitor.

## 2018-08-11 NOTE — ED PROVIDER NOTE - OBJECTIVE STATEMENT
28F  p/w continued vaginal bleeding and pelvic cramping. Patient was seen earlier in ED and dx with threatened AB with 9 week IUP. Since patient has been having continued cramping and some bleeding. Has used 2-3 pads since seen earlier yesterday. denies dysuria, fever, chills. patient is tearful, sad that she may be possibly having a miscarriage.

## 2018-08-11 NOTE — ED ADULT NURSE NOTE - NSIMPLEMENTINTERV_GEN_ALL_ED
Implemented All Universal Safety Interventions:  [place] to call system. Call bell, personal items and telephone within reach. Instruct patient to call for assistance. Room bathroom lighting operational. Non-slip footwear when patient is off stretcher. Physically safe environment: no spills, clutter or unnecessary equipment. Stretcher in lowest position, wheels locked, appropriate side rails in place.

## 2018-08-11 NOTE — ED PROVIDER NOTE - ATTENDING CONTRIBUTION TO CARE
28F p/w vaginal bleeding x 1 day.  9 weeks preg.   2-3 pads total.  Intermittent cramping and contractions.  Was seen here earlier for same, bleeding won’t stop and feeling dizzy.  No fever.  VS wnl.  Bilat LQ pelvic ttp as well as CVAT.    Plan check labs, rx tylenol, fluids, to be d/w OB.  Discussed possibility of miscarriage with pt, emotional support provided. 28F p/w vaginal bleeding x 1 day.  9 weeks preg.   2-3 pads total.  Intermittent cramping and contractions.  Was seen here earlier for same, bleeding won’t stop and feeling dizzy.  No fever.  VS wnl.  Bilat minimal LQ pelvic ttp as well as mild CVAT.    Plan check labs, rx tylenol, fluids, to be d/w OB.  Discussed possibility of miscarriage with pt, emotional support provided.  VS:  unremarkable except borderline tachycardia    GEN - NAD; emotional upset; A+O x3   HEAD - NC/AT     ENT - PEERL, EOMI, mucous membranes  moist , no discharge      NECK: Neck supple, non-tender without lymphadenopathy, no masses, no JVD  PULM - CTA b/l,  symmetric breath sounds  COR -  normal heart sounds    ABD - , ND, minimal LQ pelvic ttp, soft,  BACK - mild CVA tenderness, nontender spine     EXTREMS - no edema, no deformity, warm and well perfused    SKIN - no rash or bruising      NEUROLOGIC - alert, CN 2-12 intact, sensation nl, motor no focal deficit.

## 2018-08-11 NOTE — CONSULT NOTE ADULT - ASSESSMENT
A/P 28y  LMP 6/5 at 9w4d  here c/o vaginal bleeding in setting of history of confirmed IUP. TVUS consistent with single live IUP with 3.3cm L ovarian lesion, hemorrhagic cyst vs endometrioma. Bleeding due to threatened .

## 2018-08-11 NOTE — CONSULT NOTE ADULT - PROBLEM SELECTOR RECOMMENDATION 9
Plan    -TVUS confirmed live IUP with L ovarian lesion, hemorrhagic cyst vs endoetrioma. Cervix closed on exam.   -patient to follow up with Dr. Cortes  within one week for further outpatient managment of abnormal uterine bleeding   -Patient counselled on threatened  and need for close followup. Counselled to return to ED if bleeding through 1 pad per hour for 2 hours.   -continue managment per ED/ CDU team   -plan pending contact with attending.    LARA Chiu PGY2  d/w  Plan    -TVUS confirmed live IUP with L ovarian lesion, hemorrhagic cyst vs endoetrioma. Cervix closed on exam.   -patient to follow up with Dr. Cortes  within one week for further outpatient management   -Patient counselled on threatened  and need for close followup. Counselled to return to ED if bleeding through 1 pad per hour for 2 hours.   -continue managment per ED/ CDU team   -no acute gyn intervention at this time    LARA Chiu PGY2  d/w Dr. Hernandez

## 2019-12-31 ENCOUNTER — APPOINTMENT (OUTPATIENT)
Dept: ANTEPARTUM | Facility: CLINIC | Age: 29
End: 2019-12-31
Payer: MEDICAID

## 2019-12-31 ENCOUNTER — ASOB RESULT (OUTPATIENT)
Age: 29
End: 2019-12-31

## 2019-12-31 PROCEDURE — 76811 OB US DETAILED SNGL FETUS: CPT

## 2020-05-09 ENCOUNTER — INPATIENT (INPATIENT)
Facility: HOSPITAL | Age: 30
LOS: 0 days | Discharge: HOME | End: 2020-05-10
Attending: OBSTETRICS & GYNECOLOGY | Admitting: OBSTETRICS & GYNECOLOGY

## 2020-05-09 ENCOUNTER — TRANSCRIPTION ENCOUNTER (OUTPATIENT)
Age: 30
End: 2020-05-09

## 2020-05-09 VITALS
SYSTOLIC BLOOD PRESSURE: 124 MMHG | TEMPERATURE: 98 F | HEART RATE: 102 BPM | RESPIRATION RATE: 18 BRPM | DIASTOLIC BLOOD PRESSURE: 83 MMHG

## 2020-05-09 DIAGNOSIS — Z3A.00 WEEKS OF GESTATION OF PREGNANCY NOT SPECIFIED: ICD-10-CM

## 2020-05-09 DIAGNOSIS — O26.899 OTHER SPECIFIED PREGNANCY RELATED CONDITIONS, UNSPECIFIED TRIMESTER: ICD-10-CM

## 2020-05-09 LAB
BASOPHILS # BLD AUTO: 0.05 K/UL — SIGNIFICANT CHANGE UP (ref 0–0.2)
BASOPHILS NFR BLD AUTO: 0.5 % — SIGNIFICANT CHANGE UP (ref 0–2)
BLD GP AB SCN SERPL QL: NEGATIVE — SIGNIFICANT CHANGE UP
EOSINOPHIL # BLD AUTO: 0.24 K/UL — SIGNIFICANT CHANGE UP (ref 0–0.5)
EOSINOPHIL NFR BLD AUTO: 2.2 % — SIGNIFICANT CHANGE UP (ref 0–6)
HBV SURFACE AG SER-ACNC: NEGATIVE — SIGNIFICANT CHANGE UP
HCT VFR BLD CALC: 38.9 % — SIGNIFICANT CHANGE UP (ref 34.5–45)
HGB BLD-MCNC: 12.9 G/DL — SIGNIFICANT CHANGE UP (ref 11.5–15.5)
HIV 1+2 AB+HIV1 P24 AG SERPL QL IA: SIGNIFICANT CHANGE UP
IMM GRANULOCYTES NFR BLD AUTO: 0.8 % — SIGNIFICANT CHANGE UP (ref 0–1.5)
LYMPHOCYTES # BLD AUTO: 1.53 K/UL — SIGNIFICANT CHANGE UP (ref 1–3.3)
LYMPHOCYTES # BLD AUTO: 14.3 % — SIGNIFICANT CHANGE UP (ref 13–44)
MCHC RBC-ENTMCNC: 32.1 PG — SIGNIFICANT CHANGE UP (ref 27–34)
MCHC RBC-ENTMCNC: 33.2 % — SIGNIFICANT CHANGE UP (ref 32–36)
MCV RBC AUTO: 96.8 FL — SIGNIFICANT CHANGE UP (ref 80–100)
MONOCYTES # BLD AUTO: 0.97 K/UL — HIGH (ref 0–0.9)
MONOCYTES NFR BLD AUTO: 9.1 % — SIGNIFICANT CHANGE UP (ref 2–14)
NEUTROPHILS # BLD AUTO: 7.8 K/UL — HIGH (ref 1.8–7.4)
NEUTROPHILS NFR BLD AUTO: 73.1 % — SIGNIFICANT CHANGE UP (ref 43–77)
NRBC # FLD: 0 K/UL — SIGNIFICANT CHANGE UP (ref 0–0)
PLATELET # BLD AUTO: 158 K/UL — SIGNIFICANT CHANGE UP (ref 150–400)
PMV BLD: 12.3 FL — SIGNIFICANT CHANGE UP (ref 7–13)
RBC # BLD: 4.02 M/UL — SIGNIFICANT CHANGE UP (ref 3.8–5.2)
RBC # FLD: 13.2 % — SIGNIFICANT CHANGE UP (ref 10.3–14.5)
RH IG SCN BLD-IMP: POSITIVE — SIGNIFICANT CHANGE UP
T PALLIDUM AB TITR SER: NEGATIVE — SIGNIFICANT CHANGE UP
WBC # BLD: 10.68 K/UL — HIGH (ref 3.8–10.5)
WBC # FLD AUTO: 10.68 K/UL — HIGH (ref 3.8–10.5)

## 2020-05-09 RX ORDER — ACETAMINOPHEN 500 MG
975 TABLET ORAL
Refills: 0 | Status: DISCONTINUED | OUTPATIENT
Start: 2020-05-09 | End: 2020-05-10

## 2020-05-09 RX ORDER — OXYCODONE HYDROCHLORIDE 5 MG/1
5 TABLET ORAL
Refills: 0 | Status: DISCONTINUED | OUTPATIENT
Start: 2020-05-09 | End: 2020-05-10

## 2020-05-09 RX ORDER — DIBUCAINE 1 %
1 OINTMENT (GRAM) RECTAL EVERY 6 HOURS
Refills: 0 | Status: DISCONTINUED | OUTPATIENT
Start: 2020-05-09 | End: 2020-05-10

## 2020-05-09 RX ORDER — DIPHENHYDRAMINE HCL 50 MG
25 CAPSULE ORAL EVERY 6 HOURS
Refills: 0 | Status: DISCONTINUED | OUTPATIENT
Start: 2020-05-09 | End: 2020-05-10

## 2020-05-09 RX ORDER — IBUPROFEN 200 MG
600 TABLET ORAL EVERY 6 HOURS
Refills: 0 | Status: COMPLETED | OUTPATIENT
Start: 2020-05-09 | End: 2021-04-07

## 2020-05-09 RX ORDER — AER TRAVELER 0.5 G/1
1 SOLUTION RECTAL; TOPICAL EVERY 4 HOURS
Refills: 0 | Status: DISCONTINUED | OUTPATIENT
Start: 2020-05-09 | End: 2020-05-10

## 2020-05-09 RX ORDER — LANOLIN
1 OINTMENT (GRAM) TOPICAL EVERY 6 HOURS
Refills: 0 | Status: DISCONTINUED | OUTPATIENT
Start: 2020-05-09 | End: 2020-05-10

## 2020-05-09 RX ORDER — OXYTOCIN 10 UNIT/ML
333.33 VIAL (ML) INJECTION
Qty: 20 | Refills: 0 | Status: DISCONTINUED | OUTPATIENT
Start: 2020-05-09 | End: 2020-05-10

## 2020-05-09 RX ORDER — MAGNESIUM HYDROXIDE 400 MG/1
30 TABLET, CHEWABLE ORAL
Refills: 0 | Status: DISCONTINUED | OUTPATIENT
Start: 2020-05-09 | End: 2020-05-10

## 2020-05-09 RX ORDER — SODIUM CHLORIDE 9 MG/ML
500 INJECTION, SOLUTION INTRAVENOUS ONCE
Refills: 0 | Status: COMPLETED | OUTPATIENT
Start: 2020-05-09 | End: 2020-05-09

## 2020-05-09 RX ORDER — SIMETHICONE 80 MG/1
80 TABLET, CHEWABLE ORAL EVERY 4 HOURS
Refills: 0 | Status: DISCONTINUED | OUTPATIENT
Start: 2020-05-09 | End: 2020-05-10

## 2020-05-09 RX ORDER — HYDROCORTISONE 1 %
1 OINTMENT (GRAM) TOPICAL EVERY 6 HOURS
Refills: 0 | Status: DISCONTINUED | OUTPATIENT
Start: 2020-05-09 | End: 2020-05-10

## 2020-05-09 RX ORDER — SODIUM CHLORIDE 9 MG/ML
1000 INJECTION, SOLUTION INTRAVENOUS
Refills: 0 | Status: DISCONTINUED | OUTPATIENT
Start: 2020-05-09 | End: 2020-05-10

## 2020-05-09 RX ORDER — OXYCODONE HYDROCHLORIDE 5 MG/1
5 TABLET ORAL ONCE
Refills: 0 | Status: DISCONTINUED | OUTPATIENT
Start: 2020-05-09 | End: 2020-05-10

## 2020-05-09 RX ORDER — PRAMOXINE HYDROCHLORIDE 150 MG/15G
1 AEROSOL, FOAM RECTAL EVERY 4 HOURS
Refills: 0 | Status: DISCONTINUED | OUTPATIENT
Start: 2020-05-09 | End: 2020-05-10

## 2020-05-09 RX ORDER — BENZOCAINE 10 %
1 GEL (GRAM) MUCOUS MEMBRANE EVERY 6 HOURS
Refills: 0 | Status: DISCONTINUED | OUTPATIENT
Start: 2020-05-09 | End: 2020-05-10

## 2020-05-09 RX ORDER — KETOROLAC TROMETHAMINE 30 MG/ML
30 SYRINGE (ML) INJECTION ONCE
Refills: 0 | Status: DISCONTINUED | OUTPATIENT
Start: 2020-05-09 | End: 2020-05-09

## 2020-05-09 RX ORDER — TETANUS TOXOID, REDUCED DIPHTHERIA TOXOID AND ACELLULAR PERTUSSIS VACCINE, ADSORBED 5; 2.5; 8; 8; 2.5 [IU]/.5ML; [IU]/.5ML; UG/.5ML; UG/.5ML; UG/.5ML
0.5 SUSPENSION INTRAMUSCULAR ONCE
Refills: 0 | Status: DISCONTINUED | OUTPATIENT
Start: 2020-05-09 | End: 2020-05-10

## 2020-05-09 RX ORDER — SODIUM CHLORIDE 9 MG/ML
3 INJECTION INTRAMUSCULAR; INTRAVENOUS; SUBCUTANEOUS EVERY 8 HOURS
Refills: 0 | Status: DISCONTINUED | OUTPATIENT
Start: 2020-05-09 | End: 2020-05-10

## 2020-05-09 RX ORDER — OXYTOCIN 10 UNIT/ML
333.33 VIAL (ML) INJECTION
Qty: 20 | Refills: 0 | Status: COMPLETED | OUTPATIENT
Start: 2020-05-09 | End: 2020-05-09

## 2020-05-09 RX ADMIN — Medication 1000 MILLIUNIT(S)/MIN: at 17:24

## 2020-05-09 RX ADMIN — Medication 30 MILLIGRAM(S): at 20:47

## 2020-05-09 RX ADMIN — SODIUM CHLORIDE 1000 MILLILITER(S): 9 INJECTION, SOLUTION INTRAVENOUS at 18:00

## 2020-05-09 RX ADMIN — Medication 1000 MILLIUNIT(S)/MIN: at 20:47

## 2020-05-09 RX ADMIN — SODIUM CHLORIDE 125 MILLILITER(S): 9 INJECTION, SOLUTION INTRAVENOUS at 15:00

## 2020-05-09 NOTE — OB RN DELIVERY SUMMARY - NS_SEPSISRSKCALC_OBGYN_ALL_OB_FT
EOS calculated successfully. EOS Risk Factor: 0.5/1000 live births ([facility] national incidence); GA=39w1d; Temp=98.78; ROM=9.533; GBS='Unknown'; Antibiotics='No antibiotics or any antibiotics < 2 hrs prior to birth'

## 2020-05-09 NOTE — CHART NOTE - NSCHARTNOTEFT_GEN_A_CORE
R1 OB Progress Note    Patient seen and evaluated at bedside.  Denies complaints.  Comfortable w/ epidural.      T(C): 36.7 (20 @ 10:02), Max: 36.7 (20 @ 08:54)  HR: 86 (20 @ 13:52) (79 - 120)  BP: 122/79 (20 @ 13:46) (97/63 - 135/69)  RR: 16 (20 @ 10:02) (16 - 18)  SpO2: 100% (20 @ 13:52) (97% - 100%)    SVE: 9/100/-1  EFM: 130, moderate variability, -accels, +variable decels  [place]: Ctx q3min    A/P 29y  in labor:      -Labor: Continue expectant management  -Fetus: Category II resuscitation with positioning, fluids, supplemental O2    -Analgesia: Epidural in place    KERRY Jackson, PGY-1

## 2020-05-09 NOTE — DISCHARGE NOTE OB - CARE PLAN
Principal Discharge DX:	Vaginal delivery  Goal:	Full recovery  Assessment and plan of treatment:	routine post partum care

## 2020-05-09 NOTE — OB PROVIDER TRIAGE NOTE - NSHPPHYSICALEXAM_GEN_ALL_CORE
Gen: A&O x 3; appears uncomfortable   Vitals: BP-124/83; P-102; T-36.7    Pulm-CTA B/L; no wheezes/rales  Cor-clear S1S2 no murmurs appreciated  Abd exam-soft and nontender; gravid    VE-4/90/-2; grossly ruptured with clear fluid  TAS to confirm vtx    NST cat I with 135 baseline, +accels and mod variability; ctx's Q4-5 min

## 2020-05-09 NOTE — OB RN PATIENT PROFILE - NAME OF FATHER, OB PROFILE
-- DO NOT REPLY / DO NOT REPLY ALL --  -- Message is from the [facility]--    COVID-19 Universal Screening: Negative    General Patient Message      Reason for Call: Patient is calling in because her prescription was being put in wrong she needs the Accu-Chek Guide Glucose Monitoring System DRUMS which contains 6 lancets in the drum, she does not need the system nor the device what she needs is the Lancet drums that goes inside the accu-chek fast clik lancet device. It should be a 3 month supply and not one box. Patient is running low on medication and needs this fixed and sent in. Please contact patient.       Caller Information       Type Contact Phone    03/26/2020 12:09 PM Phone (Incoming) Rhea Helms (Self) [phone] (M)          Alternative phone number: none     Turnaround time given to caller:   \"This message will be sent to [state Provider's name]. The clinical team will fulfill your request as soon as they review your message.\"     same as HCP above

## 2020-05-09 NOTE — OB PROVIDER TRIAGE NOTE - HISTORY OF PRESENT ILLNESS
28yo  female  @ 39.1 SLIUP uncomp PNC here co leaking of fluid since 7 am, clear with ctx's Q3-4 min. Pt reports GFM.    Pmhx-denies  Pshx/Hosp-denies  Meds-PNV; Vit C  NKDA  Gyn-denies  Past ob-2013-#  FT uncomp               TOP x 1               SAB complete 2018  Soc-denies

## 2020-05-09 NOTE — DISCHARGE NOTE OB - PATIENT PORTAL LINK FT
You can access the FollowMyHealth Patient Portal offered by [facility] by registering at the following website: [email or web address]. By joining [facility]’s FollowMyHealth portal, you will also be able to view your health information using other applications (apps) compatible with our system.

## 2020-05-09 NOTE — OB PROVIDER H&P - HISTORY OF PRESENT ILLNESS
30yo  female  @ 39.1 SLIUP uncomp PNC here co leaking of fluid since 7 am, clear with ctx's Q3-4 min. Pt reports GFM.    Pmhx-denies  Pshx/Hosp-denies  Meds-PNV; Vit C  NKDA  Gyn-denies  Past ob-2013-#  FT uncomp               TOP x 1               SAB complete 2018  Soc-denies

## 2020-05-09 NOTE — CHART NOTE - NSCHARTNOTEFT_GEN_A_CORE
r4 ob note    pt examined at bedside for prolonged deceleration lasting 7 min to lucas of 70 with return to baseline after positional change and intrauterine resuscitation    SVE:8/80/-1  EFM:130/modvar/-acc/prol dec as detailed above  Potter:irreg    ISE placed   Mali, PGY-4

## 2020-05-09 NOTE — OB PROVIDER DELIVERY SUMMARY - NSPROVIDERDELIVERYNOTE_OBGYN_ALL_OB_FT
decision for VAVD made due to Cat II tracing  kiwi vacuum applied, 2 pop offs and 3 pulls. head delivered on third pull.  head delivered easily followed by shoulders and body.  1 min 15 sec delayed cord clamping. peds present at delivery.  placenta delivered spontaneously  2nd deg lac repaired w chromic  good hemostasis  fundus firm and midline decision for VAVD made due to Cat II tracing  Kiwi vacuum applied, 2 pop offs and 3 pulls. head delivered on third pull.  head delivered easily followed by shoulders and body.  1 min 15 sec delayed cord clamping. peds present at delivery.  placenta delivered spontaneously  2nd deg lac repaired w chromic  good hemostasis  fundus firm and midline    Attending Attestation:  I was present with resident during the performance of the delivery and was directly involved in the management of the patient. I discussed the case with the resident and agree with the findings and plan as documented in the resident’s note.  Stew Mc M.D.

## 2020-05-09 NOTE — OB RN TRIAGE NOTE - PMH
Miscarriage  No D&C  No pertinent past medical history    Termination of pregnancy (fetus)  medication  Vaginal delivery  1/2/13 F 7.0 NSD

## 2020-05-09 NOTE — OB PROVIDER TRIAGE NOTE - NSOBPROVIDERNOTE_OBGYN_ALL_OB_FT
28yo  female  @ 39.1 wks SLIUP uncomp PNC here with ruptured membranes since 7am with ctx's  -pt in labor  -pt declining epidural at this time  -pt was swabbed for Covid-19  -pt was discussed with Dr Mc

## 2020-05-09 NOTE — OB RN PATIENT PROFILE - ALERT: PERTINENT HISTORY
1st Trimester Sonogram/Fetal Non-Stress Test (NST)/Ultra Screen at 12 Weeks/20 Week Level II Sonogram/BioPhysical Profile(s)

## 2020-05-09 NOTE — PROVIDER CONTACT NOTE (OTHER) - ASSESSMENT
UTERUS FIRM AND AT UMBILICUS, BLEEDING MODERATE, PATIENT ASYMPTOMATIC, DENIES DIZZINESS LIGHTHEADEDNESS, BP/O2SAT/RR AND TEMP WNL

## 2020-05-10 VITALS
OXYGEN SATURATION: 99 % | SYSTOLIC BLOOD PRESSURE: 113 MMHG | HEART RATE: 102 BPM | DIASTOLIC BLOOD PRESSURE: 68 MMHG | TEMPERATURE: 99 F | RESPIRATION RATE: 17 BRPM

## 2020-05-10 LAB
BASOPHILS # BLD AUTO: 0.06 K/UL — SIGNIFICANT CHANGE UP (ref 0–0.2)
BASOPHILS NFR BLD AUTO: 0.4 % — SIGNIFICANT CHANGE UP (ref 0–2)
EOSINOPHIL # BLD AUTO: 0.25 K/UL — SIGNIFICANT CHANGE UP (ref 0–0.5)
EOSINOPHIL NFR BLD AUTO: 1.5 % — SIGNIFICANT CHANGE UP (ref 0–6)
HCT VFR BLD CALC: 38.6 % — SIGNIFICANT CHANGE UP (ref 34.5–45)
HGB BLD-MCNC: 12.7 G/DL — SIGNIFICANT CHANGE UP (ref 11.5–15.5)
IMM GRANULOCYTES NFR BLD AUTO: 0.7 % — SIGNIFICANT CHANGE UP (ref 0–1.5)
LYMPHOCYTES # BLD AUTO: 15.2 % — SIGNIFICANT CHANGE UP (ref 13–44)
LYMPHOCYTES # BLD AUTO: 2.58 K/UL — SIGNIFICANT CHANGE UP (ref 1–3.3)
MCHC RBC-ENTMCNC: 32.6 PG — SIGNIFICANT CHANGE UP (ref 27–34)
MCHC RBC-ENTMCNC: 32.9 % — SIGNIFICANT CHANGE UP (ref 32–36)
MCV RBC AUTO: 99 FL — SIGNIFICANT CHANGE UP (ref 80–100)
MONOCYTES # BLD AUTO: 1.32 K/UL — HIGH (ref 0–0.9)
MONOCYTES NFR BLD AUTO: 7.8 % — SIGNIFICANT CHANGE UP (ref 2–14)
NEUTROPHILS # BLD AUTO: 12.6 K/UL — HIGH (ref 1.8–7.4)
NEUTROPHILS NFR BLD AUTO: 74.4 % — SIGNIFICANT CHANGE UP (ref 43–77)
NRBC # FLD: 0 K/UL — SIGNIFICANT CHANGE UP (ref 0–0)
PLATELET # BLD AUTO: 166 K/UL — SIGNIFICANT CHANGE UP (ref 150–400)
PMV BLD: 12.3 FL — SIGNIFICANT CHANGE UP (ref 7–13)
RBC # BLD: 3.9 M/UL — SIGNIFICANT CHANGE UP (ref 3.8–5.2)
RBC # FLD: 13.7 % — SIGNIFICANT CHANGE UP (ref 10.3–14.5)
RUBV IGG SER-ACNC: 3.8 INDEX — SIGNIFICANT CHANGE UP
RUBV IGG SER-IMP: POSITIVE — SIGNIFICANT CHANGE UP
SARS-COV-2 RNA SPEC QL NAA+PROBE: SIGNIFICANT CHANGE UP
WBC # BLD: 16.92 K/UL — HIGH (ref 3.8–10.5)
WBC # FLD AUTO: 16.92 K/UL — HIGH (ref 3.8–10.5)

## 2020-05-10 RX ORDER — IBUPROFEN 200 MG
1 TABLET ORAL
Qty: 0 | Refills: 0 | DISCHARGE
Start: 2020-05-10

## 2020-05-10 RX ORDER — ACETAMINOPHEN 500 MG
3 TABLET ORAL
Qty: 0 | Refills: 0 | DISCHARGE
Start: 2020-05-10

## 2020-05-10 RX ORDER — ASCORBIC ACID 60 MG
1 TABLET,CHEWABLE ORAL
Qty: 0 | Refills: 0 | DISCHARGE

## 2020-05-10 RX ORDER — IBUPROFEN 200 MG
600 TABLET ORAL EVERY 6 HOURS
Refills: 0 | Status: DISCONTINUED | OUTPATIENT
Start: 2020-05-10 | End: 2020-05-10

## 2020-05-10 RX ADMIN — MAGNESIUM HYDROXIDE 30 MILLILITER(S): 400 TABLET, CHEWABLE ORAL at 10:04

## 2020-05-10 RX ADMIN — Medication 600 MILLIGRAM(S): at 06:05

## 2020-05-10 RX ADMIN — Medication 600 MILLIGRAM(S): at 11:43

## 2020-05-10 RX ADMIN — Medication 600 MILLIGRAM(S): at 17:28

## 2020-05-10 NOTE — PROGRESS NOTE ADULT - SUBJECTIVE AND OBJECTIVE BOX
Patient assessed at 0940.  Subjective  Pain: Patient denies any pain at the time of assessment. Pain being managed well by pain management protocol  Complaints:  None.   Infant feeding:     breastfeeding, formula feeding and using breast pump                           Feeding related issues or concerns: none      Vitals  T(C): 36.4 (05-10-20 @ 05:32), Max: 37.1 (20 @ 16:21)  HR: 88 (05-10-20 @ 05:32) (79 - 145)  BP: 118/72 (05-10-20 @ 05:32) (97/63 - 135/69)  RR: 18 (05-10-20 @ 05:32) (16 - 18)  SpO2: 97% (05-10-20 @ 05:32) (94% - 100%)  Wt(kg): --    LABS:                          12.7   16.92 )-----------( 166      ( 10 May 2020 06:17 )             38.6                         12.9   10.68 )-----------( 158      ( 09 May 2020 09:55 )             38.9       Blood Type: A Positive      Treponema Pallidum Antibody Interpretation: Negative ( @ 09:55)      Rubella IgG Interp: Positive ( @ 17:00)         MEDICATIONS  (STANDING):  acetaminophen   Tablet .. 975 milliGRAM(s) Oral <User Schedule>  diphtheria/tetanus/pertussis (acellular) Vaccine (ADAcel) 0.5 milliLiter(s) IntraMuscular once  ibuprofen  Tablet. 600 milliGRAM(s) Oral every 6 hours  prenatal multivitamin 1 Tablet(s) Oral daily  sodium chloride 0.9% lock flush 3 milliLiter(s) IV Push every 8 hours    MEDICATIONS  (PRN):  benzocaine 20%/menthol 0.5% Spray 1 Spray(s) Topical every 6 hours PRN for Perineal discomfort  dibucaine 1% Ointment 1 Application(s) Topical every 6 hours PRN Perineal discomfort  diphenhydrAMINE 25 milliGRAM(s) Oral every 6 hours PRN Pruritus  hydrocortisone 1% Cream 1 Application(s) Topical every 6 hours PRN Moderate Pain (4-6)  lanolin Ointment 1 Application(s) Topical every 6 hours PRN nipple soreness  magnesium hydroxide Suspension 30 milliLiter(s) Oral two times a day PRN Constipation  oxyCODONE    IR 5 milliGRAM(s) Oral every 3 hours PRN Moderate to Severe Pain (4-10)  oxyCODONE    IR 5 milliGRAM(s) Oral once PRN Moderate to Severe Pain (4-10)  pramoxine 1%/zinc 5% Cream 1 Application(s) Topical every 4 hours PRN Moderate Pain (4-6)  simethicone 80 milliGRAM(s) Chew every 4 hours PRN Gas  witch hazel Pads 1 Application(s) Topical every 4 hours PRN Perineal discomfort        28y/o . Day #1 @ 1601   postpartum .  Past medical/surgical/OB/GYN history significant for NSVdx1, TOPx1, SABx1  Current Issues:  none  Alert and orientedx3. Out of bed ambulating. Voiding. Tolerating a regular diet.  Lung sound clear bilaterally  Breasts: soft, nontender  Nipples: intact  Abdomen: Soft, nontender, nondistended. Fundus firm. Positive bowel sounds  Vagina: Lochia light rubra  Perineum:  slight edema with no erythema noted  Laceration/episiotomy site: 2nd degree laceration  Lower extremities: No edema noted bilaterally and equal of lower extremities. Nontender calves.  No erythema noted. Positive pedal pulses. No palpable veins noted.  Other relevant physical exam findings: none          PLAN:  Continue routine postpartum care.   Increase ambulation, analgesia PRN and pain medication protocol standing oxycodone, ibuprofen and acetaminophen.  Continue regular diet  Discussed breast/nipple care and breastfeeding.   Discharge to home today. Discussed discharge planning.

## 2021-06-01 PROBLEM — Z33.2 ENCOUNTER FOR ELECTIVE TERMINATION OF PREGNANCY: Chronic | Status: ACTIVE | Noted: 2020-05-09

## 2021-06-01 PROBLEM — O03.9 COMPLETE OR UNSPECIFIED SPONTANEOUS ABORTION WITHOUT COMPLICATION: Chronic | Status: ACTIVE | Noted: 2020-05-09

## 2021-06-02 ENCOUNTER — NON-APPOINTMENT (OUTPATIENT)
Age: 31
End: 2021-06-02

## 2021-06-02 ENCOUNTER — APPOINTMENT (OUTPATIENT)
Dept: DERMATOLOGY | Facility: CLINIC | Age: 31
End: 2021-06-02
Payer: MEDICAID

## 2021-06-02 VITALS — WEIGHT: 140 LBS | HEIGHT: 64 IN | BODY MASS INDEX: 23.9 KG/M2

## 2021-06-02 DIAGNOSIS — L70.0 ACNE VULGARIS: ICD-10-CM

## 2021-06-02 DIAGNOSIS — L73.0 ACNE KELOID: ICD-10-CM

## 2021-06-02 PROCEDURE — 99204 OFFICE O/P NEW MOD 45 MIN: CPT

## 2021-06-02 RX ORDER — CLINDAMYCIN PHOSPHATE 10 MG/ML
1 LOTION TOPICAL
Qty: 1 | Refills: 11 | Status: ACTIVE | COMMUNITY
Start: 2021-06-02 | End: 1900-01-01

## 2021-07-06 RX ORDER — TRETINOIN 0.25 MG/G
0.03 CREAM TOPICAL
Qty: 1 | Refills: 6 | Status: ACTIVE | COMMUNITY
Start: 2021-06-02

## 2023-10-04 ENCOUNTER — APPOINTMENT (OUTPATIENT)
Dept: DERMATOLOGY | Facility: CLINIC | Age: 33
End: 2023-10-04
Payer: MEDICAID

## 2023-10-04 DIAGNOSIS — B36.0 PITYRIASIS VERSICOLOR: ICD-10-CM

## 2023-10-04 PROCEDURE — 99213 OFFICE O/P EST LOW 20 MIN: CPT

## 2023-10-04 RX ORDER — KETOCONAZOLE 20.5 MG/ML
2 SHAMPOO, SUSPENSION TOPICAL
Qty: 1 | Refills: 5 | Status: ACTIVE | COMMUNITY
Start: 2023-10-04 | End: 1900-01-01

## 2023-10-31 RX ORDER — TRETINOIN 0.25 MG/G
0.03 CREAM TOPICAL
Qty: 1 | Refills: 2 | Status: ACTIVE | COMMUNITY
Start: 2023-10-31 | End: 1900-01-01

## 2025-01-06 NOTE — OB PROVIDER H&P - NS_OBGYNHISTORY_OBGYN_ALL_OB_FT
[FreeTextEntry3] : Procedure - Cerumen Removal. Indication - Obstructing visualization of TM After informed verbal consent is obtained, cerumen was removed from the b/l ear canal(s) with a curette and alligator forceps  Normal appearing canal(s) following removal.
2013- FT 7#  2018-TOP x 1  SAB x 1 complete